# Patient Record
Sex: MALE | URBAN - METROPOLITAN AREA
[De-identification: names, ages, dates, MRNs, and addresses within clinical notes are randomized per-mention and may not be internally consistent; named-entity substitution may affect disease eponyms.]

---

## 2020-12-18 ENCOUNTER — OFFICE VISIT (OUTPATIENT)
Dept: URBAN - METROPOLITAN AREA CLINIC 37 | Facility: CLINIC | Age: 30
End: 2020-12-18

## 2020-12-18 RX ORDER — FAMOTIDINE 20 MG/1
1 TABLET AT BEDTIME AS NEEDED TABLET, FILM COATED ORAL ONCE A DAY
Qty: 30 | Status: ACTIVE | COMMUNITY

## 2020-12-18 RX ORDER — DICYCLOMINE HYDROCHLORIDE 20 MG/1
1 TABLET TABLET ORAL THREE TIMES A DAY
Qty: 90 | Status: ACTIVE | COMMUNITY

## 2020-12-18 RX ORDER — ESOMEPRAZOLE MAGNESIUM 40 MG/1
1 CAPSULE CAPSULE, DELAYED RELEASE ORAL ONCE A DAY
Qty: 30 | Status: ACTIVE | COMMUNITY

## 2020-12-18 RX ORDER — ONDANSETRON 4 MG/1
1 TABLET ON THE TONGUE AND ALLOW TO DISSOLVE TABLET, ORALLY DISINTEGRATING ORAL ONCE A DAY
Qty: 30 | Status: ACTIVE | COMMUNITY

## 2020-12-18 RX ORDER — URSODIOL 250 MG/1
1 TABLET WITH FOOD TABLET, FILM COATED ORAL TID
Status: ACTIVE | COMMUNITY

## 2020-12-18 RX ORDER — METOCLOPRAMIDE HYDROCHLORIDE 5 MG/1
1 TABLET BEFORE MEALS TABLET ORAL TWICE A DAY
Qty: 60 | Status: ACTIVE | COMMUNITY

## 2020-12-18 NOTE — HPI-MIGRATED HPI
Interim investigations : Labs done on: ->  * 12/05/2020 during ER admission:  - CMP: Glu: 89 ; BUN: 11 ; Cr: 0.9 ; Na: 141; K 3.9 ; Alb: 4.6 ; Tbili: 0.5 ; ALP: 50 ; ALT: 18 ; AST: 16  - CBC: WBC: 6.3 ; RBC: 5.80 (H) ; Hgb: 15.3 ; Hct: 45.8 ; Plt: 244;   Initial consultation : Patient is here for -> Gallstones and upper abdominal pain Onset of symptoms: 2 weeks ago Patient was admitted to the [facility] ER on 12/05/2020 due to epigastric pain.  US Abdomen was ordered and the result showed multiple gallstones and sludge present without CBD dilation.  Upon discharge, patient was prescribed with Dicyclomine 20mg Q6H + Protonix 40mg QD + Reglan 5mg Q6H + Ursodiol 250mg TID Current Symptoms: ???;

## 2021-01-04 ENCOUNTER — OFFICE VISIT (OUTPATIENT)
Dept: URBAN - METROPOLITAN AREA CLINIC 82 | Facility: CLINIC | Age: 31
End: 2021-01-04

## 2021-01-29 ENCOUNTER — LAB OUTSIDE AN ENCOUNTER (OUTPATIENT)
Dept: URBAN - METROPOLITAN AREA CLINIC 37 | Facility: CLINIC | Age: 31
End: 2021-01-29

## 2021-01-29 ENCOUNTER — OFFICE VISIT (OUTPATIENT)
Dept: URBAN - METROPOLITAN AREA CLINIC 37 | Facility: CLINIC | Age: 31
End: 2021-01-29

## 2021-01-29 VITALS — BODY MASS INDEX: 28.63 KG/M2 | WEIGHT: 200 LBS | HEIGHT: 70 IN

## 2021-01-29 RX ORDER — URSODIOL 250 MG/1
1 TABLET WITH FOOD TABLET, FILM COATED ORAL TID
COMMUNITY

## 2021-01-29 RX ORDER — DICYCLOMINE HYDROCHLORIDE 20 MG/1
1 TABLET TABLET ORAL THREE TIMES A DAY
Qty: 90 | COMMUNITY

## 2021-01-29 RX ORDER — FAMOTIDINE 20 MG/1
1 TABLET AT BEDTIME AS NEEDED TABLET, FILM COATED ORAL ONCE A DAY
Qty: 30 | COMMUNITY

## 2021-01-29 RX ORDER — METOCLOPRAMIDE HYDROCHLORIDE 5 MG/1
1 TABLET BEFORE MEALS TABLET ORAL TWICE A DAY
Qty: 60 | COMMUNITY

## 2021-01-29 RX ORDER — ESOMEPRAZOLE MAGNESIUM 40 MG/1
1 CAPSULE CAPSULE, DELAYED RELEASE ORAL
Status: ACTIVE | COMMUNITY
End: 2020-10-28

## 2021-01-29 RX ORDER — ONDANSETRON 4 MG/1
1 TABLET ON THE TONGUE AND ALLOW TO DISSOLVE TABLET, ORALLY DISINTEGRATING ORAL ONCE A DAY
Qty: 30 | COMMUNITY

## 2021-01-29 NOTE — HPI-MIGRATED HPI
Interim investigations : Labs done on: -> 12/05/2020 during ER admission:  - CMP: Glu: 89 ; BUN: 11 ; Cr: 0.9 ; Na: 141; K 3.9 ; Alb: 4.6 ; Tbili: 0.5 ; ALP: 50 ; ALT: 18 ; AST: 16  - CBC: WBC: 6.3 ; RBC: 5.80 (H) ; Hgb: 15.3 ; Hct: 45.8 ; Plt: 244. ;   Initial consultation : Patient is here for -> Epigastric pain; Onset of symptoms:2 months ago.; He scheduled appointment with us in 12/18/2020 but he was not able to keep the appointment. ; He had 1 time sharp pain after midnight,; Location: epigastric pain and radiated to the mid back. Pain persistent until he went to ER. Associated symtoms: nausea; Patient was seen at [facility] ER on 12/05/2020 due to epigastric pain; * US RUQ Abdomen : Evidence of multiple gallstones and sludge without evidence of CBD dilation.; Patient was dischaged on the same date and given referal general surgery for removal of gallstones, however, patient did not have surgery done due to asymtomatic gallstones; Upon discharge, was prescribed with Dicyclomine 20mg Q6H + Protonix 40mg QD + Reglan 5mg Q6H + Ursodiol 250mg TID; Patient now still experiences the GERD symptoms intermittenly but he is able to relieve the pain with prescribed medication from ER.; ; ***Patient also had EGD done by Dr. Leo 1 year ago. EGD revealed normal result; Family history of GI malignancy: denies;

## 2021-02-02 LAB — RESULT:: NOT DETECTED

## 2021-02-11 ENCOUNTER — OFFICE VISIT (OUTPATIENT)
Dept: URBAN - METROPOLITAN AREA CLINIC 37 | Facility: CLINIC | Age: 31
End: 2021-02-11

## 2021-02-11 VITALS — BODY MASS INDEX: 27.49 KG/M2 | HEIGHT: 70 IN | WEIGHT: 192 LBS

## 2021-02-11 RX ORDER — ONDANSETRON 4 MG/1
1 TABLET ON THE TONGUE AND ALLOW TO DISSOLVE TABLET, ORALLY DISINTEGRATING ORAL ONCE A DAY
Qty: 30 | Status: ON HOLD | COMMUNITY

## 2021-02-11 RX ORDER — METOCLOPRAMIDE HYDROCHLORIDE 5 MG/1
1 TABLET BEFORE MEALS TABLET ORAL TWICE A DAY
Qty: 60 | Status: ON HOLD | COMMUNITY

## 2021-02-11 RX ORDER — FAMOTIDINE 20 MG/1
1 TABLET AT BEDTIME AS NEEDED TABLET, FILM COATED ORAL ONCE A DAY
Qty: 30 | Status: ON HOLD | COMMUNITY

## 2021-02-11 RX ORDER — OMEPRAZOLE 20 MG/1
1 CAPSULE BEFORE BREAKFAST CAPSULE, DELAYED RELEASE ORAL ONCE A DAY
Qty: 30 CAPSULE | Refills: 2 | OUTPATIENT
Start: 2021-02-11

## 2021-02-11 RX ORDER — URSODIOL 250 MG/1
1 TABLET WITH FOOD TABLET, FILM COATED ORAL TID
Status: ACTIVE | COMMUNITY

## 2021-02-11 RX ORDER — DICYCLOMINE HYDROCHLORIDE 20 MG/1
1 TABLET TABLET ORAL THREE TIMES A DAY
Qty: 90 | Status: ON HOLD | COMMUNITY

## 2021-02-11 NOTE — HPI-MIGRATED HPI
Interim investigations : Labs done on: ->  * 02/01/2021, see below;   Follow up OV : Patient is here for a routine OV for -> GERD;   Follow up OV : Last OV was -> 2 weeks ago GERD:  Last EGD done 03/10/2020, with Dr. Anibal Bonilla due to abdominal pain and Dyspepsia. Grade A esophagitis found at the GE junction. There was gastritis and erosions present in the gastric antrum with bxx showing minimal chronic gastritis, negative for H. pylori/IM. Normal duodenum.  Patient recently had a UBT test to r/o H. pylori, see below Current symptoms: still in pain and has to present in ER after our first visit  Cholethiliasis:  Diagnosed when patient went to [facility] ER on 12/2020 due to epigastric pain.  Subsequently, saw general surgeon but didn't have cholecystectomy due to he was not sure that how big his stones and how many stones he has Patient continues taking Ursodiol daily;

## 2021-05-13 ENCOUNTER — OFFICE VISIT (OUTPATIENT)
Dept: URBAN - METROPOLITAN AREA CLINIC 37 | Facility: CLINIC | Age: 31
End: 2021-05-13

## 2021-05-13 RX ORDER — URSODIOL 250 MG/1
1 TABLET WITH FOOD TABLET, FILM COATED ORAL TID
Status: ACTIVE | COMMUNITY

## 2021-05-13 RX ORDER — OMEPRAZOLE 20 MG/1
1 CAPSULE BEFORE BREAKFAST CAPSULE, DELAYED RELEASE ORAL ONCE A DAY
Qty: 30 CAPSULE | Refills: 2 | Status: ACTIVE | COMMUNITY
Start: 2021-02-11

## 2021-05-13 RX ORDER — OMEPRAZOLE 20 MG/1
1 CAPSULE BEFORE BREAKFAST CAPSULE, DELAYED RELEASE ORAL ONCE A DAY
Qty: 30 CAPSULE | Refills: 2 | OUTPATIENT

## 2021-05-13 RX ORDER — FAMOTIDINE 20 MG/1
1 TABLET AT BEDTIME AS NEEDED TABLET, FILM COATED ORAL ONCE A DAY
Qty: 30 | Status: ON HOLD | COMMUNITY

## 2021-05-13 RX ORDER — METOCLOPRAMIDE HYDROCHLORIDE 5 MG/1
1 TABLET BEFORE MEALS TABLET ORAL TWICE A DAY
Qty: 60 | Status: ON HOLD | COMMUNITY

## 2021-05-13 RX ORDER — DICYCLOMINE HYDROCHLORIDE 20 MG/1
1 TABLET TABLET ORAL THREE TIMES A DAY
Qty: 90 | Status: ON HOLD | COMMUNITY

## 2021-05-13 RX ORDER — ONDANSETRON 4 MG/1
1 TABLET ON THE TONGUE AND ALLOW TO DISSOLVE TABLET, ORALLY DISINTEGRATING ORAL ONCE A DAY
Qty: 30 | Status: ON HOLD | COMMUNITY

## 2021-05-13 NOTE — HPI-MIGRATED HPI
Interim investigations : Labs done on: ->  * 02/01/2021, see below;   Follow up OV : Patient is here for a routine OV for -> GERD;   Follow up OV : Last OV was -> 3 months ago  GERD:  Last EGD done 03/10/2020, with Dr. Anibal Bonilla due to abdominal pain and Dyspepsia. Grade A esophagitis found at the GE junction. There was gastritis and erosions present in the gastric antrum with bxx showing minimal chronic gastritis, negative for H. pylori/IM. Normal duodenum.  Patient recently had a UBT test to r/o H. pylori with negative result, see below Patient has been on Omeprazole 20mg QAM Current symptoms: ???  Cholethiliasis:  Diagnosed when patient went to [facility] ER on 12/2020 due to epigastric pain.  Subsequently, saw general surgeon but didn't have cholecystectomy due to he was not sure that how big his stones and how many stones he has Patient continues taking Ursodiol daily;

## 2021-05-28 ENCOUNTER — OFFICE VISIT (OUTPATIENT)
Dept: URBAN - METROPOLITAN AREA CLINIC 37 | Facility: CLINIC | Age: 31
End: 2021-05-28

## 2021-05-28 RX ORDER — OMEPRAZOLE 20 MG/1
1 CAPSULE BEFORE BREAKFAST CAPSULE, DELAYED RELEASE ORAL ONCE A DAY
Qty: 30 CAPSULE | Refills: 2 | OUTPATIENT

## 2021-05-28 NOTE — HPI-MIGRATED HPI
Follow up OV : Patient is here for a routine OV for -> GERD;   Follow up OV : Last OV was -> 3 months ago  GERD:  Last EGD done 03/10/2020, with Dr. Anibal Bonilla due to abdominal pain and Dyspepsia. Grade A esophagitis found at the GE junction. There was gastritis and erosions present in the gastric antrum with bxx showing minimal chronic gastritis, negative for H. pylori/IM. Normal duodenum.  Patient recently had a UBT test to r/o H. pylori with negative result, see below Patient has been on Omeprazole 20mg QAM Current symptoms: ???  Cholethiliasis:  Diagnosed when patient went to [facility] ER on 12/2020 due to epigastric pain.  Subsequently, saw general surgeon but didn't have cholecystectomy due to he was not sure that how big his stones and how many stones he has Patient continues taking Ursodiol daily;   Interim investigations : Labs done on: ->  * 02/01/2021, see below;

## 2022-01-03 ENCOUNTER — DASHBOARD ENCOUNTERS (OUTPATIENT)
Age: 32
End: 2022-01-03

## 2022-01-04 ENCOUNTER — OFFICE VISIT (OUTPATIENT)
Dept: URBAN - METROPOLITAN AREA CLINIC 37 | Facility: CLINIC | Age: 32
End: 2022-01-04
Payer: COMMERCIAL

## 2022-01-04 ENCOUNTER — LAB OUTSIDE AN ENCOUNTER (OUTPATIENT)
Dept: URBAN - METROPOLITAN AREA CLINIC 37 | Facility: CLINIC | Age: 32
End: 2022-01-04

## 2022-01-04 VITALS — WEIGHT: 180 LBS | BODY MASS INDEX: 25.77 KG/M2 | HEIGHT: 70 IN

## 2022-01-04 DIAGNOSIS — R10.13 EPIGASTRIC PAIN: ICD-10-CM

## 2022-01-04 DIAGNOSIS — K80.20 CALCULUS OF GALLBLADDER WITHOUT CHOLECYSTITIS WITHOUT OBSTRUCTION: ICD-10-CM

## 2022-01-04 DIAGNOSIS — K25.9 GASTRIC EROSION, UNSPECIFIED CHRONICITY: ICD-10-CM

## 2022-01-04 DIAGNOSIS — K21.9 GASTROESOPHAGEAL REFLUX DISEASE, UNSPECIFIED WHETHER ESOPHAGITIS PRESENT: ICD-10-CM

## 2022-01-04 PROBLEM — 235595009: Status: ACTIVE | Noted: 2021-01-29

## 2022-01-04 PROBLEM — 70342003: Status: ACTIVE | Noted: 2021-01-29

## 2022-01-04 PROBLEM — 235651006: Status: ACTIVE | Noted: 2022-01-04

## 2022-01-04 PROCEDURE — 99213 OFFICE O/P EST LOW 20 MIN: CPT | Performed by: NURSE PRACTITIONER

## 2022-01-04 RX ORDER — FAMOTIDINE 20 MG/1
1 TABLET AT BEDTIME AS NEEDED TABLET, FILM COATED ORAL ONCE A DAY
Qty: 30 | Status: ON HOLD | COMMUNITY

## 2022-01-04 RX ORDER — OMEPRAZOLE 20 MG/1
1 CAPSULE BEFORE BREAKFAST CAPSULE, DELAYED RELEASE ORAL ONCE A DAY
Qty: 30 CAPSULE | Refills: 2 | Status: ACTIVE | COMMUNITY

## 2022-01-04 RX ORDER — DICYCLOMINE HYDROCHLORIDE 20 MG/1
1 TABLET TABLET ORAL THREE TIMES A DAY
Qty: 90 | Status: ON HOLD | COMMUNITY

## 2022-01-04 RX ORDER — ONDANSETRON 4 MG/1
1 TABLET ON THE TONGUE AND ALLOW TO DISSOLVE TABLET, ORALLY DISINTEGRATING ORAL ONCE A DAY
Qty: 30 | Status: ON HOLD | COMMUNITY

## 2022-01-04 RX ORDER — METOCLOPRAMIDE HYDROCHLORIDE 5 MG/1
1 TABLET BEFORE MEALS TABLET ORAL TWICE A DAY
Qty: 60 | Status: ON HOLD | COMMUNITY

## 2022-01-04 RX ORDER — OMEPRAZOLE 40 MG/1
1 CAPSULE BEFORE BREAKFAST CAPSULE, DELAYED RELEASE ORAL ONCE A DAY
Qty: 30 CAPSULE | Refills: 2 | OUTPATIENT

## 2022-01-04 RX ORDER — SUCRALFATE 1 G
1 TABLET ON AN EMPTY STOMACH TABLET ORAL TWICE A DAY
Qty: 60 | Refills: 0 | OUTPATIENT
Start: 2022-01-04 | End: 2022-02-03

## 2022-01-04 RX ORDER — URSODIOL 250 MG/1
1 TABLET WITH FOOD TABLET, FILM COATED ORAL TID
Status: ACTIVE | COMMUNITY

## 2022-03-10 ENCOUNTER — TELEPHONE ENCOUNTER (OUTPATIENT)
Dept: URBAN - METROPOLITAN AREA CLINIC 36 | Facility: CLINIC | Age: 32
End: 2022-03-10

## 2022-03-14 ENCOUNTER — OFFICE VISIT (OUTPATIENT)
Dept: URBAN - METROPOLITAN AREA MEDICAL CENTER 10 | Facility: MEDICAL CENTER | Age: 32
End: 2022-03-14

## 2022-03-31 ENCOUNTER — OFFICE VISIT (OUTPATIENT)
Dept: URBAN - METROPOLITAN AREA CLINIC 37 | Facility: CLINIC | Age: 32
End: 2022-03-31